# Patient Record
Sex: FEMALE | Race: BLACK OR AFRICAN AMERICAN | Employment: FULL TIME
[De-identification: names, ages, dates, MRNs, and addresses within clinical notes are randomized per-mention and may not be internally consistent; named-entity substitution may affect disease eponyms.]

---

## 2024-05-18 ENCOUNTER — HOSPITAL ENCOUNTER (EMERGENCY)
Facility: HOSPITAL | Age: 39
Discharge: HOME | End: 2024-05-18
Attending: STUDENT IN AN ORGANIZED HEALTH CARE EDUCATION/TRAINING PROGRAM
Payer: MEDICARE

## 2024-05-18 VITALS
HEART RATE: 73 BPM | DIASTOLIC BLOOD PRESSURE: 83 MMHG | WEIGHT: 215 LBS | OXYGEN SATURATION: 100 % | TEMPERATURE: 98.8 F | RESPIRATION RATE: 18 BRPM | BODY MASS INDEX: 33.74 KG/M2 | HEIGHT: 67 IN | SYSTOLIC BLOOD PRESSURE: 148 MMHG

## 2024-05-18 DIAGNOSIS — N39.0 ACUTE UTI (URINARY TRACT INFECTION): Primary | ICD-10-CM

## 2024-05-18 LAB
APPEARANCE UR: ABNORMAL
BACTERIA URNS QL MICRO: ABNORMAL /HPF
BILIRUB UR QL: NEGATIVE
COLOR UR: YELLOW
EPITH CASTS URNS QL MICRO: ABNORMAL /LPF (ref 0–5)
GLUCOSE UR STRIP.AUTO-MCNC: NEGATIVE MG/DL
HCG UR QL: NEGATIVE
HGB UR QL STRIP: ABNORMAL
KETONES UR QL STRIP.AUTO: NEGATIVE MG/DL
LEUKOCYTE ESTERASE UR QL STRIP.AUTO: ABNORMAL
NITRITE UR QL STRIP.AUTO: NEGATIVE
PH UR STRIP: 6.5 (ref 5–8)
PROT UR STRIP-MCNC: NEGATIVE MG/DL
RBC #/AREA URNS HPF: ABNORMAL /HPF (ref 0–5)
SP GR UR REFRACTOMETRY: 1.01 (ref 1–1.03)
UROBILINOGEN UR QL STRIP.AUTO: 0.2 EU/DL (ref 0.2–1)
WBC URNS QL MICRO: ABNORMAL /HPF (ref 0–5)

## 2024-05-18 PROCEDURE — 99283 EMERGENCY DEPT VISIT LOW MDM: CPT

## 2024-05-18 PROCEDURE — 81025 URINE PREGNANCY TEST: CPT

## 2024-05-18 PROCEDURE — 81001 URINALYSIS AUTO W/SCOPE: CPT

## 2024-05-18 PROCEDURE — 87086 URINE CULTURE/COLONY COUNT: CPT

## 2024-05-18 RX ORDER — NITROFURANTOIN 25; 75 MG/1; MG/1
100 CAPSULE ORAL 2 TIMES DAILY
Qty: 10 CAPSULE | Refills: 0 | Status: SHIPPED | OUTPATIENT
Start: 2024-05-18 | End: 2024-05-23

## 2024-05-18 RX ORDER — PHENAZOPYRIDINE HYDROCHLORIDE 100 MG/1
200 TABLET, FILM COATED ORAL 3 TIMES DAILY PRN
Qty: 6 TABLET | Refills: 0 | Status: SHIPPED | OUTPATIENT
Start: 2024-05-18 | End: 2024-05-20

## 2024-05-18 NOTE — ED TRIAGE NOTES
Pt c/o urinary frequency, urgency, and dysuria x 4 days and reports cloudy urine. Pt denies hematuria and denies vaginal complaints.

## 2024-05-18 NOTE — DISCHARGE INSTRUCTIONS
You came to the ER with concerns of urine problems.  Your urine test showed that you have a urine infection, most likely only in the bladder.  We have prescribed you antibiotics to help clear up your infection.  Antibiotics for a urine infection usually work quickly so you should feel improved within 24 hours.    If you feel like you are getting worse, such as pain over your kidneys, fever, nausea/vomiting, or any other symptoms that you find concerning, please come back to the ER right away.

## 2024-05-18 NOTE — ED PROVIDER NOTES
[facility] EMERGENCY DEPT  EMERGENCY DEPARTMENT ENCOUNTER       Pt Name: Claudia Cueva  MRN: [id number]  Birthdate 1985  Date of evaluation: 5/18/2024  Provider: Bertrand Khan MD   PCP: Unknown, Provider, APRN - NP  Note Started: 11:58 AM 5/18/24     CHIEF COMPLAINT       Chief Complaint   Patient presents with    Urinary Frequency    Dysuria     Pt c/o urinary frequency, urgency, and dysuria x 4 days and reports cloudy urine. Pt denies hematuria and denies vaginal complaints.        HISTORY OF PRESENT ILLNESS: 1 or more elements      History From: Patient  None     Claudia Cueva is a 38 y.o. female who presents with 4 days of intermittent discomfort with urination and low volume urination.  She has had no flank pain.  No fevers or chills.  No nausea or vomiting.  Denies vaginal discharge or vaginal bleeding.     Nursing Notes were all reviewed and agreed with or any disagreements were addressed in the HPI.     REVIEW OF SYSTEMS      Review of Systems     Positives and Pertinent negatives as per HPI.    PAST HISTORY     Past Medical History:  No past medical history on file.      Past Surgical History:  Past Surgical History:   Procedure Laterality Date    US I&D BREAST ABSCESS DEEP  9/5/2018     I&D BREAST ABSCESS DEEP       Family History:  No family history on file.    Social History:       Allergies:  Allergies   Allergen Reactions    Pcn [Penicillins] Anaphylaxis    Amoxicillin Hives       CURRENT MEDICATIONS      Previous Medications    No medications on file         PHYSICAL EXAM      ED Triage Vitals [05/18/24 1104]   Enc Vitals Group      BP (!) 155/93      Pulse 73      Respirations 18      Temp 98.8 °F (37.1 °C)      Temp Source Oral      SpO2 100 %      Weight - Scale 97.5 kg (215 lb)      Height 1.702 m (5' 7\")      Head Circumference       Peak Flow       Pain Score       Pain Loc       Pain Edu?       Excl. in GC?               Physical Exam  Vitals and nursing note reviewed.   Constitutional:        Appearance: Normal appearance.   Pulmonary:      Effort: Pulmonary effort is normal.   Abdominal:      General: Abdomen is flat.      Tenderness: There is no abdominal tenderness.   Skin:     General: Skin is warm and dry.   Neurological:      Mental Status: She is alert.          DIAGNOSTIC RESULTS   LABS:     Recent Results (from the past 24 hour(s))   Urinalysis    Collection Time: 05/18/24 11:20 AM   Result Value Ref Range    Color, UA YELLOW      Appearance CLOUDY      Specific Sandy, UA 1.006 1.005 - 1.030      pH, Urine 6.5 5.0 - 8.0      Protein, UA Negative NEG mg/dL    Glucose, Ur Negative NEG mg/dL    Ketones, Urine Negative NEG mg/dL    Bilirubin, Urine Negative NEG      Blood, Urine TRACE (A) NEG      Urobilinogen, Urine 0.2 0.2 - 1.0 EU/dL    Nitrite, Urine Negative NEG      Leukocyte Esterase, Urine LARGE (A) NEG     Pregnancy, Urine    Collection Time: 05/18/24 11:20 AM   Result Value Ref Range    Pregnancy, Urine Negative NEG     Urinalysis, Micro    Collection Time: 05/18/24 11:20 AM   Result Value Ref Range    WBC, UA 21 to 30 0 - 5 /hpf    RBC, UA 0 to 1 0 - 5 /hpf    Epithelial Cells, UA 2+ 0 - 5 /lpf    BACTERIA, URINE FEW (A) NEG /hpf   }       RADIOLOGY:     Interpretation per the Radiologist below, if available at the time of this note:     [unfilled]        PROCEDURES   Unless otherwise noted below, none  Procedures       CRITICAL CARE TIME   None    SCREENINGS   NIH Stroke Score       Heart Score       Curb-65          EMERGENCY DEPARTMENT COURSE and DIFFERENTIAL DIAGNOSIS/MDM   Vitals:    Vitals:    05/18/24 1104   BP: (!) 155/93   Pulse: 73   Resp: 18   Temp: 98.8 °F (37.1 °C)   TempSrc: Oral   SpO2: 100%   Weight: 97.5 kg (215 lb)   Height: 1.702 m (5' 7\")            Patient was given the following medications:  Medications - No data to display      Chronic Conditions: None    Social Determinants affecting Dx or Tx: None    Records Reviewed (source and summary of external

## 2024-05-21 LAB
BACTERIA SPEC CULT: NORMAL
CC UR VC: NORMAL
SERVICE CMNT-IMP: NORMAL